# Patient Record
Sex: FEMALE | Race: WHITE | NOT HISPANIC OR LATINO | Employment: OTHER | URBAN - NONMETROPOLITAN AREA
[De-identification: names, ages, dates, MRNs, and addresses within clinical notes are randomized per-mention and may not be internally consistent; named-entity substitution may affect disease eponyms.]

---

## 2018-02-02 ENCOUNTER — DOCUMENTATION ONLY (OUTPATIENT)
Dept: FAMILY MEDICINE | Facility: OTHER | Age: 58
End: 2018-02-02

## 2018-02-02 RX ORDER — HYDROCODONE BITARTRATE AND ACETAMINOPHEN 5; 300 MG/1; MG/1
1 TABLET ORAL EVERY 4 HOURS PRN
COMMUNITY
Start: 2015-08-26

## 2018-02-02 RX ORDER — SUMATRIPTAN 50 MG/1
TABLET, FILM COATED ORAL
COMMUNITY
Start: 2016-07-19

## 2018-02-02 RX ORDER — ESTRADIOL 0.03 MG/D
FILM, EXTENDED RELEASE TRANSDERMAL
COMMUNITY
Start: 2016-09-30

## 2023-03-30 ENCOUNTER — APPOINTMENT (OUTPATIENT)
Dept: GENERAL RADIOLOGY | Facility: OTHER | Age: 63
End: 2023-03-30
Attending: FAMILY MEDICINE
Payer: COMMERCIAL

## 2023-03-30 ENCOUNTER — HOSPITAL ENCOUNTER (EMERGENCY)
Facility: OTHER | Age: 63
Discharge: HOME | End: 2023-03-30
Attending: FAMILY MEDICINE | Admitting: FAMILY MEDICINE
Payer: COMMERCIAL

## 2023-03-30 ENCOUNTER — HOSPITAL ENCOUNTER (EMERGENCY)
Facility: OTHER | Age: 63
Discharge: LEFT BEFORE DISCHARGE | End: 2023-03-30
Admitting: FAMILY MEDICINE
Payer: COMMERCIAL

## 2023-03-30 VITALS
OXYGEN SATURATION: 95 % | HEIGHT: 67 IN | SYSTOLIC BLOOD PRESSURE: 100 MMHG | TEMPERATURE: 98.6 F | DIASTOLIC BLOOD PRESSURE: 59 MMHG | RESPIRATION RATE: 18 BRPM | BODY MASS INDEX: 20.4 KG/M2 | WEIGHT: 130 LBS | HEART RATE: 81 BPM

## 2023-03-30 VITALS
SYSTOLIC BLOOD PRESSURE: 113 MMHG | WEIGHT: 130 LBS | DIASTOLIC BLOOD PRESSURE: 53 MMHG | HEIGHT: 67 IN | RESPIRATION RATE: 16 BRPM | HEART RATE: 80 BPM | BODY MASS INDEX: 20.4 KG/M2 | TEMPERATURE: 98.4 F | OXYGEN SATURATION: 96 %

## 2023-03-30 DIAGNOSIS — S62.524A CLOSED NONDISPLACED FRACTURE OF DISTAL PHALANX OF RIGHT THUMB, INITIAL ENCOUNTER: ICD-10-CM
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
## 2023-03-30 PROCEDURE — 73140 X-RAY EXAM OF FINGER(S): CPT | Mod: RT

## 2023-03-30 PROCEDURE — 29125 APPL SHORT ARM SPLINT STATIC: CPT | Mod: RT | Performed by: FAMILY MEDICINE

## 2023-03-30 PROCEDURE — 99283 EMERGENCY DEPT VISIT LOW MDM: CPT | Performed by: FAMILY MEDICINE

## 2023-03-30 PROCEDURE — 99207 PR NO CHARGE LOS: CPT | Performed by: FAMILY MEDICINE

## 2023-03-30 PROCEDURE — 250N000013 HC RX MED GY IP 250 OP 250 PS 637: Performed by: FAMILY MEDICINE

## 2023-03-30 PROCEDURE — 99281 EMR DPT VST MAYX REQ PHY/QHP: CPT | Mod: 25 | Performed by: FAMILY MEDICINE

## 2023-03-30 RX ORDER — BUPROPION HYDROCHLORIDE 100 MG/1
100 TABLET ORAL 2 TIMES DAILY
COMMUNITY

## 2023-03-30 RX ORDER — IBUPROFEN 400 MG/1
400 TABLET, FILM COATED ORAL ONCE
Status: COMPLETED | OUTPATIENT
Start: 2023-03-30 | End: 2023-03-30

## 2023-03-30 RX ORDER — FLUOXETINE 10 MG/1
10 TABLET, FILM COATED ORAL DAILY
COMMUNITY
Start: 2022-11-29

## 2023-03-30 RX ADMIN — IBUPROFEN 400 MG: 200 TABLET, FILM COATED ORAL at 15:59

## 2023-03-30 ASSESSMENT — ENCOUNTER SYMPTOMS
FACIAL SWELLING: 0
CHOKING: 0
FEVER: 0
CHILLS: 0
SHORTNESS OF BREATH: 0
FLANK PAIN: 0

## 2023-03-30 ASSESSMENT — ACTIVITIES OF DAILY LIVING (ADL): ADLS_ACUITY_SCORE: 35

## 2023-03-30 NOTE — ED TRIAGE NOTES
"Pt c/o Rt thumb pain after falling while snow shoeing yesterday. Swelling and bruising noted to Rt thumb. /59   Pulse 81   Temp 98.6  F (37  C) (Tympanic)   Resp 18   Ht 1.702 m (5' 7\")   Wt 59 kg (130 lb)   SpO2 95%   BMI 20.36 kg/m         Triage Assessment     Row Name 03/30/23 8956       Triage Assessment (Adult)    Airway WDL WDL       Respiratory WDL    Respiratory WDL WDL       Skin Circulation/Temperature WDL    Skin Circulation/Temperature WDL X  bruising Rt thumb       Cardiac WDL    Cardiac WDL WDL       Peripheral/Neurovascular WDL    Peripheral Neurovascular WDL WDL       Cognitive/Neuro/Behavioral WDL    Cognitive/Neuro/Behavioral WDL WDL              "

## 2023-03-30 NOTE — ED TRIAGE NOTES
"Pt presents to ED from home for c/o right thumb pain from fall yesterday. Thumb today is bruised and swollen. No other complaints.   /53   Pulse 80   Temp 98.4  F (36.9  C) (Tympanic)   Resp 16   Ht 1.702 m (5' 7\")   Wt 59 kg (130 lb)   SpO2 96%   BMI 20.36 kg/m         Triage Assessment     Row Name 03/30/23 1159       Triage Assessment (Adult)    Airway WDL WDL       Respiratory WDL    Respiratory WDL WDL       Skin Circulation/Temperature WDL    Skin Circulation/Temperature WDL X  bruising to right thumb       Cardiac WDL    Cardiac WDL WDL       Peripheral/Neurovascular WDL    Peripheral Neurovascular WDL WDL       Cognitive/Neuro/Behavioral WDL    Cognitive/Neuro/Behavioral WDL WDL              "

## 2023-03-30 NOTE — ED PROVIDER NOTES
History     Chief Complaint   Patient presents with     Thumb Discomfort     HPI  Tisha Palmer is a 62 year old female with history of carpal tunnel syndrome, hip pain who presents to the emergency department a day after a fall.  Patient was snowshoeing when she fell, she fell onto an outstretched right hand, and jammed her right thumb.  Right thumb joint is bruised and swollen.  Patient range of motion is limited by pain, but she does feel like she has full range of motion.  No wrist tenderness.  Patient did not hit her head or lose consciousness.  Patient did not pass out, she simply fell in the ice and snow with no shoes on.  Patient is not on any blood thinners.    Review nurses notes below, similar history is related to me.  Pt c/o Rt thumb pain after falling while snow shoeing yesterday. Swelling and bruising noted to Rt thumb  Allergies:  Allergies   Allergen Reactions     Sulfa Drugs Rash and Hives       Problem List:    Patient Active Problem List    Diagnosis Date Noted     Atypical squamous cells of undetermined significance (ASCUS) on Papanicolaou smear of cervix 09/07/2015     Priority: Medium     Overview:   8/2015       Cervical segment dysfunction 07/18/2014     Priority: Medium     Carpal tunnel syndrome 07/18/2014     Priority: Medium     Hip pain 07/03/2012     Priority: Medium     Nonallopathic lesion of pelvic region 08/09/2011     Priority: Medium     Lumbosacral ligament sprain 08/09/2011     Priority: Medium        Past Medical History:    Past Medical History:   Diagnosis Date     Atypical squamous cell changes of cervix undetermined significance favor benign        Past Surgical History:    No past surgical history on file.    Family History:    Family History   Problem Relation Age of Onset     Heart Disease Mother 79        Heart Disease,CABG, endocarditis     Prostate Cancer Father         Cancer-prostate     Colon Cancer Father         Cancer-colon     Breast Cancer No family hx  "of         Cancer-breast     Blood Disease No family hx of         Blood Disease     Ovarian Cancer No family hx of         Cancer-ovarian     Diabetes No family hx of         Diabetes     Hyperlipidemia No family hx of         Hyperlipidemia     Hypertension No family hx of         Hypertension       Social History:  Marital Status:   [2]  Social History     Tobacco Use     Smoking status: Never     Smokeless tobacco: Never   Substance Use Topics     Alcohol use: Yes     Comment: Alcoholic Drinks/day: Mild     Drug use: Unknown     Types: Other     Comment: Drug use: Not Asked        Medications:    buPROPion (WELLBUTRIN) 100 MG tablet  estradiol (VIVELLE-DOT) 0.025 MG/24HR BIW patch  FLUoxetine (PROZAC) 10 MG tablet  Hydrocodone-Acetaminophen 5-300 MG TABS  progesterone (PROMETRIUM) 100 MG capsule  SUMAtriptan (IMITREX) 50 MG tablet          Review of Systems   Constitutional: Negative for chills and fever.   HENT: Negative for facial swelling.    Respiratory: Negative for choking and shortness of breath.    Genitourinary: Negative for flank pain.       Physical Exam   BP: 100/59  Pulse: 81  Temp: 98.6  F (37  C)  Resp: 18  Height: 170.2 cm (5' 7\")  Weight: 59 kg (130 lb)  SpO2: 95 %      Physical Exam  Vitals and nursing note reviewed.   Constitutional:       Appearance: Normal appearance.   HENT:      Head: Atraumatic.   Pulmonary:      Effort: No respiratory distress.   Musculoskeletal:      Right shoulder: Normal.      Right upper arm: Normal.      Right elbow: Normal.      Right forearm: Normal.      Right wrist: No swelling, deformity, tenderness, bony tenderness or snuff box tenderness. Normal range of motion. Normal pulse.      Left wrist: Normal.      Right hand: Swelling, tenderness and bony tenderness present. No deformity or lacerations. Decreased range of motion. Normal strength. Normal sensation. Normal capillary refill. Normal pulse.      Left hand: Normal. No swelling.      Cervical back: " Normal range of motion.   Neurological:      General: No focal deficit present.      Mental Status: She is alert.      Gait: Gait normal.       Patient does have right thumb interphalangeal swelling and bruising.  Range of motion does seem somewhat limited by pain but she does have flexion and extension at the IP, MCP and at the wrist.  As stated above no snuffbox tenderness is appreciated.    ED Course   Regrettably patient had been here earlier in the day and the wait was long and she had another commitment, she did leave after her x-ray.  I apologized for that experience and further weight again a second ED visit and we immediately reviewed her x-ray.                                                                   Impression:  Nondisplaced, comminuted intra-articular fracture involving the distal  phalanx of the thumb extending into the interphalangeal joint.     FRANKI ARAYA MD      No results found for this or any previous visit (from the past 24 hour(s)).    Medications   ibuprofen (ADVIL/MOTRIN) tablet 400 mg (400 mg Oral $Given 3/30/23 1559)       Assessments & Plan (with Medical Decision Making)     I have reviewed the nursing notes.    I have reviewed the findings, diagnosis, plan and need for follow up with the patient.  Orthoglass thumb spica and radial gutter splint applied.  Injury is intra-articular.  Treated somewhat aggressively with a Ortho-Glass splint because the prefabricated splint did not adequately immobilize the interphalangeal joint.  Moreover given the mechanism of a fall on outstretched hand I felt more comfortable treating her with thumb spica immobilization even though my clinical suspicion for concomitant occult scaphoid fracture is very low.  Also based on my assessment today she does have flexion and extension, I thus have low clinical suspicion for occult extensor mechanism or flexor mechanism disruption however these can be occult and difficult to diagnose when patient has  pain and swelling.  I did place an orthopedic referral, marked urgently for next 3 to 5 days for reassessment.  We discussed the potential natural course of this, it is comminuted and intra-articular so placing a pin or a wire in the IP joint is certainly a possibility, will defer obviously definitive treatment to orthopedics.    Patient is from [place] however they do have a cabin here and she does wish to stay here and follow-up here with local orthopedics.  We did discuss that should she happen decide to go back to [place] that they would need to establish orthopedic care as soon as possible.  Return with any questions, uncontrolled pain or splint problems.  Patient and  verbalized understanding plan and agreement patient left the ED in improved condition.    New Prescriptions    No medications on file       Final diagnoses:   Closed nondisplaced fracture of distal phalanx of right thumb, initial encounter   Fall, initial encounter       3/30/2023   [facility] AND [facility]     Julian Patterson MD  03/30/23 2329

## 2023-04-06 ENCOUNTER — OFFICE VISIT (OUTPATIENT)
Dept: FAMILY MEDICINE | Facility: OTHER | Age: 63
End: 2023-04-06
Attending: FAMILY MEDICINE
Payer: COMMERCIAL

## 2023-04-06 ENCOUNTER — HOSPITAL ENCOUNTER (OUTPATIENT)
Dept: GENERAL RADIOLOGY | Facility: OTHER | Age: 63
Discharge: HOME | End: 2023-04-06
Attending: FAMILY MEDICINE
Payer: COMMERCIAL

## 2023-04-06 VITALS
BODY MASS INDEX: 21.46 KG/M2 | WEIGHT: 137 LBS | DIASTOLIC BLOOD PRESSURE: 78 MMHG | RESPIRATION RATE: 16 BRPM | SYSTOLIC BLOOD PRESSURE: 114 MMHG | TEMPERATURE: 97.5 F | OXYGEN SATURATION: 98 % | HEART RATE: 71 BPM

## 2023-04-06 DIAGNOSIS — S62.524A CLOSED NONDISPLACED FRACTURE OF DISTAL PHALANX OF RIGHT THUMB, INITIAL ENCOUNTER: Primary | ICD-10-CM

## 2023-04-06 PROCEDURE — 73140 X-RAY EXAM OF FINGER(S): CPT | Mod: RT

## 2023-04-06 PROCEDURE — 26750 TREAT FINGER FRACTURE EACH: CPT | Mod: F5 | Performed by: FAMILY MEDICINE

## 2023-04-06 ASSESSMENT — PAIN SCALES - GENERAL: PAINLEVEL: MODERATE PAIN (4)

## 2023-04-06 NOTE — PROGRESS NOTES
Sports Medicine Office Note    HPI:  62-year-old female coming in for evaluation of a right hand injury that occurred when she fell while snowshoeing on 3/29.  She jammed her thumb.  She was seen in the ER on 3/30.  She was found to have a fracture to the distal phalanx with intra-articular involvement.  She was placed in a thumb spica splint.  She is currently endorsing 6/10 pain.  She characterizes the pain as stabbing and throbbing.  No previous injuries to this thumb.      EXAM:  /78 (BP Location: Right arm, Patient Position: Sitting, Cuff Size: Adult Regular)   Pulse 71   Temp 97.5  F (36.4  C) (Tympanic)   Resp 16   Wt 62.1 kg (137 lb)   SpO2 98%   BMI 21.46 kg/m    MUSCULOSKELETAL EXAM:  RIGHT HAND  Inspection:  -No gross deformity  -No subungual hematoma  -Moderate bruising and swelling about the distal thumb  -Scars:  None    Tenderness to palpation of the:  -Ulnar styloid:  Negative  -Distal radius:  Negative  -Cecy's tubercle:  Negative  -Scapholunate ligament:  Negative  -Scaphoid in anatomical snuffbox:  Negative  -1st CMC joint:  Negative  -1st MCP joint:  Negative  -Metacarpals:  Negative  -Thumb: Mild tenderness to palpation of the IP joint and distal phalanx    Sensation:  -Intact to light touch in the radial, median, and ulnar nerve distributions    Motor:  -Intact AIN, PIN, and IO  - Flexion/extension intact across the IP joint    Other:  -No signs of cyanosis. Normal skin temperature of the upper extremity.  -Elbow:  No gross deformity. Full range of motion.  -Left hand:  No gross deformity. No palpable tenderness. Normal strength and ROM.      IMAGING:  3/30/2023: 3 view right thumb x-ray  - Comminuted, intra-articular distal phalanx fracture with approximately 40% intra-articular involvement.  No definitive displacement or angulation.    4/6/2023: 3 view right thumb x-ray  - Fracture to the base of the distal phalanx is again noted without change in bony  alignment.      ASSESSMENT/PLAN:  Diagnoses and all orders for this visit:  Closed nondisplaced fracture of distal phalanx of right thumb, initial encounter  -     XR Finger Right G/E 2 Views  -     Orthopedic  Referral  -     CLOSED TX DIST FINGER FX W/O MANIP, EACH    62-year-old female with a closed, nondisplaced, comminuted intra-articular fracture of the base of the distal phalanx of the right thumb.  X-rays from 3/30 and 4/6 were both personally reviewed in the office with the findings as demonstrated above by my interpretation.  She is now 1 week and 1 day out from her injury.  Because of the intra-articular involvement we will immobilize the IP joint.  - Patient fit with a Staxx splint  - Follow-up in 2 weeks for repeat x-rays out of the splint  - Anticipate approximately 6 weeks of immobilization  - Patient mentions that she will be residing in [facility] starting soon over the coming months-recommend establishing with orthopedics within the suggested timeframe for follow-up (discussed steps needed to transfer records to new clinic)      Jerson Bey MD  4/6/2023  3:29 PM    Total time spent with this patient was 28 minutes which included chart review, visualization and independent interpretation of images, time spent with the patient, and documentation.    Procedure time:  0 minute(s)

## 2023-04-06 NOTE — NURSING NOTE
Pt presents with  for consult on Closed fracture of proximal phalanx of right thumb.  Fell 3/29/31 while snowshoing

## (undated) RX ORDER — IBUPROFEN 200 MG
TABLET ORAL
Status: DISPENSED
Start: 2023-03-30